# Patient Record
Sex: FEMALE | Race: WHITE | HISPANIC OR LATINO | Employment: FULL TIME | URBAN - METROPOLITAN AREA
[De-identification: names, ages, dates, MRNs, and addresses within clinical notes are randomized per-mention and may not be internally consistent; named-entity substitution may affect disease eponyms.]

---

## 2024-01-10 ENCOUNTER — OFFICE VISIT (OUTPATIENT)
Dept: PRIMARY CARE | Facility: CLINIC | Age: 39
End: 2024-01-10
Payer: COMMERCIAL

## 2024-01-10 VITALS
SYSTOLIC BLOOD PRESSURE: 108 MMHG | HEIGHT: 61 IN | HEART RATE: 82 BPM | OXYGEN SATURATION: 100 % | TEMPERATURE: 98.1 F | DIASTOLIC BLOOD PRESSURE: 64 MMHG | WEIGHT: 146 LBS | BODY MASS INDEX: 27.56 KG/M2 | RESPIRATION RATE: 16 BRPM

## 2024-01-10 DIAGNOSIS — N63.10 MASS OF RIGHT BREAST, UNSPECIFIED QUADRANT: ICD-10-CM

## 2024-01-10 DIAGNOSIS — Z00.00 GENERAL MEDICAL EXAM: Primary | ICD-10-CM

## 2024-01-10 PROBLEM — E55.9 VITAMIN D DEFICIENCY: Status: ACTIVE | Noted: 2024-01-10

## 2024-01-10 PROCEDURE — 99385 PREV VISIT NEW AGE 18-39: CPT | Performed by: NURSE PRACTITIONER

## 2024-01-10 PROCEDURE — 1036F TOBACCO NON-USER: CPT | Performed by: NURSE PRACTITIONER

## 2024-01-10 ASSESSMENT — ENCOUNTER SYMPTOMS
CONSTITUTIONAL NEGATIVE: 1
SINUS PAIN: 0
MYALGIAS: 0
NEUROLOGICAL NEGATIVE: 1
STRIDOR: 0
FEVER: 0
PALPITATIONS: 0
FATIGUE: 0
TROUBLE SWALLOWING: 0
FACIAL SWELLING: 0
WHEEZING: 0
COLOR CHANGE: 0
ARTHRALGIAS: 0
PHOTOPHOBIA: 0
HEMATOLOGIC/LYMPHATIC NEGATIVE: 1
NECK STIFFNESS: 0
CHOKING: 0
EYES NEGATIVE: 1
NECK PAIN: 0
AGITATION: 0
SLEEP DISTURBANCE: 0
EYE REDNESS: 0
HALLUCINATIONS: 0
CHILLS: 0
PSYCHIATRIC NEGATIVE: 1
CONSTIPATION: 0
BACK PAIN: 0
EYE ITCHING: 0
ABDOMINAL DISTENTION: 0
DYSPHORIC MOOD: 0
EYE DISCHARGE: 0
HYPERACTIVE: 0
WOUND: 0
GASTROINTESTINAL NEGATIVE: 1
CHEST TIGHTNESS: 0
DECREASED CONCENTRATION: 0
CARDIOVASCULAR NEGATIVE: 1
VOICE CHANGE: 0
RESPIRATORY NEGATIVE: 1
NAUSEA: 0
BLOOD IN STOOL: 0
BRUISES/BLEEDS EASILY: 0
EYE PAIN: 0
ABDOMINAL PAIN: 0
ENDOCRINE NEGATIVE: 1
ADENOPATHY: 0
COUGH: 0
ACTIVITY CHANGE: 0
APPETITE CHANGE: 0
RHINORRHEA: 0
MUSCULOSKELETAL NEGATIVE: 1
DIARRHEA: 0
ALLERGIC/IMMUNOLOGIC NEGATIVE: 1
CONFUSION: 0
UNEXPECTED WEIGHT CHANGE: 0
SHORTNESS OF BREATH: 0
ANAL BLEEDING: 0
RECTAL PAIN: 0
JOINT SWELLING: 0
DIAPHORESIS: 0
SORE THROAT: 0
SINUS PRESSURE: 0
NERVOUS/ANXIOUS: 0
APNEA: 0

## 2024-01-10 ASSESSMENT — ANXIETY QUESTIONNAIRES
2. NOT BEING ABLE TO STOP OR CONTROL WORRYING: NOT AT ALL
5. BEING SO RESTLESS THAT IT IS HARD TO SIT STILL: NOT AT ALL
IF YOU CHECKED OFF ANY PROBLEMS ON THIS QUESTIONNAIRE, HOW DIFFICULT HAVE THESE PROBLEMS MADE IT FOR YOU TO DO YOUR WORK, TAKE CARE OF THINGS AT HOME, OR GET ALONG WITH OTHER PEOPLE: NOT DIFFICULT AT ALL
3. WORRYING TOO MUCH ABOUT DIFFERENT THINGS: NOT AT ALL
1. FEELING NERVOUS, ANXIOUS, OR ON EDGE: SEVERAL DAYS
4. TROUBLE RELAXING: NOT AT ALL
7. FEELING AFRAID AS IF SOMETHING AWFUL MIGHT HAPPEN: NOT AT ALL
6. BECOMING EASILY ANNOYED OR IRRITABLE: NOT AT ALL
GAD7 TOTAL SCORE: 1

## 2024-01-10 ASSESSMENT — PATIENT HEALTH QUESTIONNAIRE - PHQ9
2. FEELING DOWN, DEPRESSED OR HOPELESS: NOT AT ALL
SUM OF ALL RESPONSES TO PHQ9 QUESTIONS 1 AND 2: 0
1. LITTLE INTEREST OR PLEASURE IN DOING THINGS: NOT AT ALL

## 2024-01-10 NOTE — PROGRESS NOTES
Dakota Saucedo is a 38 y.o. female who presents for Establish Care.  Patient stated her Dr. Retired and would like to have a regular check up and Establish Care.  Has been a few years switched her IUD out was 2-3 years ago.       Review of Systems   Constitutional: Negative.  Negative for activity change, appetite change, chills, diaphoresis, fatigue, fever and unexpected weight change.   HENT: Negative.  Negative for congestion, dental problem, drooling, ear discharge, ear pain, facial swelling, hearing loss, mouth sores, nosebleeds, postnasal drip, rhinorrhea, sinus pressure, sinus pain, sneezing, sore throat, tinnitus, trouble swallowing and voice change.    Eyes: Negative.  Negative for photophobia, pain, discharge, redness, itching and visual disturbance.   Respiratory: Negative.  Negative for apnea, cough, choking, chest tightness, shortness of breath, wheezing and stridor.    Cardiovascular: Negative.  Negative for chest pain, palpitations and leg swelling.   Gastrointestinal: Negative.  Negative for abdominal distention, abdominal pain, anal bleeding, blood in stool, constipation, diarrhea, nausea and rectal pain.   Endocrine: Negative.    Genitourinary: Negative.    Musculoskeletal: Negative.  Negative for arthralgias, back pain, gait problem, joint swelling, myalgias, neck pain and neck stiffness.   Skin: Negative.  Negative for color change, pallor, rash and wound.   Allergic/Immunologic: Negative.    Neurological: Negative.    Hematological: Negative.  Negative for adenopathy. Does not bruise/bleed easily.   Psychiatric/Behavioral: Negative.  Negative for agitation, behavioral problems, confusion, decreased concentration, dysphoric mood, hallucinations, self-injury, sleep disturbance and suicidal ideas. The patient is not nervous/anxious and is not hyperactive.    All other systems reviewed and are negative.      Objective   Physical Exam  Vitals and nursing note reviewed.   Constitutional:   "     Appearance: Normal appearance.   HENT:      Head: Normocephalic and atraumatic.      Nose: Nose normal.      Mouth/Throat:      Mouth: Mucous membranes are moist.   Eyes:      Pupils: Pupils are equal, round, and reactive to light.   Cardiovascular:      Rate and Rhythm: Normal rate and regular rhythm.      Pulses: Normal pulses.      Heart sounds: Normal heart sounds.   Pulmonary:      Effort: Pulmonary effort is normal.      Breath sounds: Normal breath sounds.   Chest:          Comments: Patient has palpable mass above right areola that is painless with irregular borders.     Abdominal:      Palpations: Abdomen is soft.   Musculoskeletal:         General: Normal range of motion.      Cervical back: Normal range of motion.   Skin:     Capillary Refill: Capillary refill takes 2 to 3 seconds.   Neurological:      General: No focal deficit present.      Mental Status: She is alert and oriented to person, place, and time.      Cranial Nerves: Cranial nerves 2-12 are intact.      Sensory: Sensation is intact.      Motor: Motor function is intact.      Deep Tendon Reflexes: Reflexes are normal and symmetric.       /64 (BP Location: Right arm, Patient Position: Sitting)   Pulse 82   Temp 36.7 °C (98.1 °F)   Resp 16   Ht 1.549 m (5' 1\")   Wt 66.2 kg (146 lb)   SpO2 100%   BMI 27.59 kg/m²       Assessment/Plan   Problem List Items Addressed This Visit    None  Visit Diagnoses       General medical exam    -  Primary    Relevant Orders    Hemoglobin A1C    Comprehensive Metabolic Panel    CBC    TSH with reflex to Free T4 if abnormal    Vitamin B12    Vitamin D 25-Hydroxy,Total (for eval of Vitamin D levels)    Lipid panel    Mass of right breast, unspecified quadrant        Relevant Orders    BI mammo bilateral diagnostic          Patient has paraguard IUD in- copper non hormonal.  Diagnostic mammogram ordered for breast mass and will follow along with patient. She has also scheduled for routine PAP with " me in the future.

## 2024-01-12 ENCOUNTER — LAB (OUTPATIENT)
Dept: LAB | Facility: LAB | Age: 39
End: 2024-01-12
Payer: COMMERCIAL

## 2024-01-12 DIAGNOSIS — Z00.00 GENERAL MEDICAL EXAM: ICD-10-CM

## 2024-01-12 LAB
ERYTHROCYTE [DISTWIDTH] IN BLOOD BY AUTOMATED COUNT: 12.3 % (ref 11.5–14.5)
EST. AVERAGE GLUCOSE BLD GHB EST-MCNC: 94 MG/DL
HBA1C MFR BLD: 4.9 %
HCT VFR BLD AUTO: 40.6 % (ref 36–46)
HGB BLD-MCNC: 13.3 G/DL (ref 12–16)
MCH RBC QN AUTO: 30.2 PG (ref 26–34)
MCHC RBC AUTO-ENTMCNC: 32.8 G/DL (ref 32–36)
MCV RBC AUTO: 92 FL (ref 80–100)
NRBC BLD-RTO: 0 /100 WBCS (ref 0–0)
PLATELET # BLD AUTO: 311 X10*3/UL (ref 150–450)
RBC # BLD AUTO: 4.41 X10*6/UL (ref 4–5.2)
WBC # BLD AUTO: 5.9 X10*3/UL (ref 4.4–11.3)

## 2024-01-12 PROCEDURE — 85027 COMPLETE CBC AUTOMATED: CPT

## 2024-01-12 PROCEDURE — 36415 COLL VENOUS BLD VENIPUNCTURE: CPT

## 2024-01-12 PROCEDURE — 83036 HEMOGLOBIN GLYCOSYLATED A1C: CPT

## 2024-01-23 ENCOUNTER — HOSPITAL ENCOUNTER (OUTPATIENT)
Dept: RADIOLOGY | Facility: CLINIC | Age: 39
Discharge: HOME | End: 2024-01-23
Payer: COMMERCIAL

## 2024-01-23 ENCOUNTER — ANCILLARY PROCEDURE (OUTPATIENT)
Dept: RADIOLOGY | Facility: CLINIC | Age: 39
End: 2024-01-23
Payer: COMMERCIAL

## 2024-01-23 VITALS — BODY MASS INDEX: 27.55 KG/M2 | HEIGHT: 61 IN | WEIGHT: 145.94 LBS

## 2024-01-23 DIAGNOSIS — N63.0 BREAST LUMP: ICD-10-CM

## 2024-01-23 DIAGNOSIS — N63.10 MASS OF RIGHT BREAST, UNSPECIFIED QUADRANT: ICD-10-CM

## 2024-01-23 PROCEDURE — 76642 ULTRASOUND BREAST LIMITED: CPT | Mod: RT

## 2024-01-23 PROCEDURE — 77062 BREAST TOMOSYNTHESIS BI: CPT

## 2024-01-23 PROCEDURE — 76982 USE 1ST TARGET LESION: CPT | Mod: RT

## 2024-01-23 PROCEDURE — 76642 ULTRASOUND BREAST LIMITED: CPT | Performed by: RADIOLOGY

## 2024-01-23 PROCEDURE — 77062 BREAST TOMOSYNTHESIS BI: CPT | Performed by: RADIOLOGY

## 2024-01-23 PROCEDURE — 77066 DX MAMMO INCL CAD BI: CPT | Performed by: RADIOLOGY

## 2024-01-24 ENCOUNTER — PROCEDURE VISIT (OUTPATIENT)
Dept: PRIMARY CARE | Facility: CLINIC | Age: 39
End: 2024-01-24
Payer: COMMERCIAL

## 2024-01-24 VITALS
RESPIRATION RATE: 16 BRPM | DIASTOLIC BLOOD PRESSURE: 62 MMHG | WEIGHT: 145.6 LBS | HEIGHT: 61 IN | HEART RATE: 71 BPM | TEMPERATURE: 98.1 F | SYSTOLIC BLOOD PRESSURE: 108 MMHG | OXYGEN SATURATION: 93 % | BODY MASS INDEX: 27.49 KG/M2

## 2024-01-24 DIAGNOSIS — Z01.419 ENCOUNTER FOR GYNECOLOGICAL EXAMINATION WITHOUT ABNORMAL FINDING: Primary | ICD-10-CM

## 2024-01-24 PROCEDURE — 88175 CYTOPATH C/V AUTO FLUID REDO: CPT

## 2024-01-24 PROCEDURE — 87624 HPV HI-RISK TYP POOLED RSLT: CPT

## 2024-01-24 PROCEDURE — 99213 OFFICE O/P EST LOW 20 MIN: CPT | Performed by: NURSE PRACTITIONER

## 2024-01-24 NOTE — PROGRESS NOTES
"Subjective   Darlene Saucedo is a 38 y.o. female who presents for Gynecologic Exam.  Patient is here for her PAP exam.    Gynecologic Exam  The current episode started today.     Review of Systems    Objective   Physical Exam  /62 (BP Location: Left arm, Patient Position: Sitting)   Pulse 71   Temp 36.7 °C (98.1 °F)   Resp 16   Ht 1.549 m (5' 0.98\")   Wt 66 kg (145 lb 9.6 oz)   SpO2 93%   BMI 27.53 kg/m²       Assessment/Plan   Problem List Items Addressed This Visit    None      "

## 2024-01-24 NOTE — PROGRESS NOTES
Subjective   Darlene Saucedo is a 38 y.o. female here for a routine exam. Current complaints: None . Personal health questionnaire reviewed: yes.   Physical examination completed last visit. Screenings up to date.   Labs Reviewed.   Gynecologic History  No LMP recorded.  Contraception: IUD  Last Pap: 4 years ago. Results were: normal  Last mammogram: yesterday. Results were: normal    Obstetric History  OB History    Para Term  AB Living   2 2           SAB IAB Ectopic Multiple Live Births                  # Outcome Date GA Lbr Leon/2nd Weight Sex Delivery Anes PTL Lv   2 Para            1 Para               Obstetric Comments   Patient has 2 boys        Objective   Physical Exam  Vitals reviewed. Chaperone present: patient declined chaperone.   HENT:      Head: Normocephalic.   Cardiovascular:      Rate and Rhythm: Normal rate and regular rhythm.      Pulses: Normal pulses.      Heart sounds: Normal heart sounds.   Pulmonary:      Effort: Pulmonary effort is normal.      Breath sounds: Normal breath sounds.   Abdominal:      Hernia: There is no hernia in the left inguinal area or right inguinal area.   Genitourinary:     Exam position: Lithotomy position.      Pubic Area: No rash or pubic lice.       Vagina: Normal.      Cervix: Discharge (normal physiolgic) present. No lesion.      Uterus: Normal.       Adnexa: Right adnexa normal and left adnexa normal.      Rectum: Normal.   Lymphadenopathy:      Lower Body: No right inguinal adenopathy.   Neurological:      General: No focal deficit present.      Mental Status: She is alert and oriented to person, place, and time. Mental status is at baseline.          Assessment/Plan   Healthy female exam.    Education reviewed: calcium supplements, safe sex/STD prevention, smoking cessation, and weight bearing exercise.  Contraception: IUD.  Follow up in: 3 years. For repeat PAP- 1 year for annual

## 2024-12-02 ENCOUNTER — PATIENT OUTREACH (OUTPATIENT)
Dept: PRIMARY CARE | Facility: CLINIC | Age: 39
End: 2024-12-02
Payer: COMMERCIAL

## 2024-12-02 RX ORDER — DOCUSATE SODIUM 100 MG/1
100 CAPSULE, LIQUID FILLED ORAL 2 TIMES DAILY
COMMUNITY
Start: 2024-11-30 | End: 2024-12-10

## 2024-12-02 RX ORDER — OXYCODONE HYDROCHLORIDE 5 MG/1
5 CAPSULE ORAL EVERY 4 HOURS PRN
COMMUNITY
Start: 2024-11-30 | End: 2024-12-07

## 2024-12-02 RX ORDER — ASPIRIN 81 MG/1
81 TABLET ORAL 2 TIMES DAILY
COMMUNITY

## 2024-12-02 RX ORDER — ACETAMINOPHEN 325 MG/1
650 TABLET ORAL EVERY 6 HOURS PRN
COMMUNITY

## 2024-12-02 NOTE — PROGRESS NOTES
Discharge Facility: [facility]  Discharge Diagnosis: Displaced fracture of neck of right talus   Admission Date: 11/25/2024  Discharge Date: 11/30/2024    PCP Appointment Date: none  Specialist Appointment Date:   -ortho 12/17/2024    Hospital Encounter and Summary Linked: Yes  See discharge assessment below for further details    Engagement  Call Start Time: 0954 (12/2/2024  9:53 AM)    Medications  Medications reviewed with patient/caregiver?: Yes (12/2/2024  9:53 AM)  Is the patient having any side effects they believe may be caused by any medication additions or changes?: No (12/2/2024  9:53 AM)  Does the patient have all medications ordered at discharge?: Yes (12/2/2024  9:53 AM)  Prescription Comments: new: oxycodone, colace, aspirin, tylenol (12/2/2024  9:53 AM)  Is the patient taking all medications as directed (includes completed medication regime)?: Yes (12/2/2024  9:53 AM)  Medication Comments: see med list (12/2/2024  9:53 AM)    Appointments  Does the patient have a primary care provider?: Yes (12/2/2024  9:53 AM)  Care Management Interventions: Advised patient to make appointment (12/2/2024  9:53 AM)  Has the patient kept scheduled appointments due by today?: Yes (12/2/2024  9:53 AM)  Care Management Interventions: Advised patient to keep appointment (12/2/2024  9:53 AM)    Self Management  What is the home health agency?: none (12/2/2024  9:53 AM)  Has home health visited the patient within 72 hours of discharge?: Not applicable (12/2/2024  9:53 AM)  What Durable Medical Equipment (DME) was ordered?: n/a (12/2/2024  9:53 AM)    Patient Teaching  Does the patient have access to their discharge instructions?: Yes (12/2/2024  9:53 AM)  Care Management Interventions: Reviewed instructions with patient (12/2/2024  9:53 AM)  What is the patient's perception of their health status since discharge?: Improving (12/2/2024  9:53 AM)  Is the patient/caregiver able to teach back the hierarchy of who to call/visit for  symptoms/problems? PCP, Specialist, Home Health nurse, Urgent Care, ED, 911: Yes (12/2/2024  9:53 AM)  Patient/Caregiver Education Comments: see wrap up (12/2/2024  9:53 AM)    Wrap Up  Wrap Up Additional Comments: CTS spoke with patient. She was admitted into [facility] on 11/25/2024 with Displaced fracture of neck of right talus. Discharged on 11/30/2024 with no home care. Patient stated that she was doing ok. She denies chest pains, shortness of breath or fevers. Pain is well controlled. She is non weight bearing. She does have help at home. Reviewed meds. No issues with obtaining the new meds: Oxycodone, colace , tylenol and aspirin. Understands discharge instructions. She did not want to make an appt with PCP at this time. Wants to wait until after her follow up with surgery. No questions or concerns at this time. (12/2/2024  9:53 AM)  Call End Time: 1002 (12/2/2024  9:53 AM)

## 2024-12-03 ENCOUNTER — TELEPHONE (OUTPATIENT)
Dept: PRIMARY CARE | Facility: CLINIC | Age: 39
End: 2024-12-03
Payer: COMMERCIAL

## 2024-12-03 NOTE — TELEPHONE ENCOUNTER
Spoke with patient.  She is on bed rest until she sees the surgeon on the 17th.    Patient will call once she is given more direction

## 2024-12-12 ENCOUNTER — PATIENT OUTREACH (OUTPATIENT)
Dept: PRIMARY CARE | Facility: CLINIC | Age: 39
End: 2024-12-12
Payer: COMMERCIAL

## 2025-01-10 ENCOUNTER — PATIENT OUTREACH (OUTPATIENT)
Dept: PRIMARY CARE | Facility: CLINIC | Age: 40
End: 2025-01-10
Payer: COMMERCIAL

## 2025-02-04 ENCOUNTER — TELEPHONE (OUTPATIENT)
Dept: PRIMARY CARE | Facility: CLINIC | Age: 40
End: 2025-02-04
Payer: COMMERCIAL

## 2025-02-04 NOTE — TELEPHONE ENCOUNTER
Patient stated she was told to get a mammogram done every year due to the findings last year.    She is requesting orders so she can get this scheduled.

## 2025-02-11 ENCOUNTER — PATIENT OUTREACH (OUTPATIENT)
Dept: PRIMARY CARE | Facility: CLINIC | Age: 40
End: 2025-02-11
Payer: COMMERCIAL

## 2025-03-19 ENCOUNTER — APPOINTMENT (OUTPATIENT)
Dept: PRIMARY CARE | Facility: CLINIC | Age: 40
End: 2025-03-19
Payer: COMMERCIAL